# Patient Record
Sex: FEMALE | Race: BLACK OR AFRICAN AMERICAN | URBAN - METROPOLITAN AREA
[De-identification: names, ages, dates, MRNs, and addresses within clinical notes are randomized per-mention and may not be internally consistent; named-entity substitution may affect disease eponyms.]

---

## 2020-09-16 ENCOUNTER — IMPORTED ENCOUNTER (OUTPATIENT)
Dept: URBAN - METROPOLITAN AREA CLINIC 50 | Facility: CLINIC | Age: 60
End: 2020-09-16

## 2020-09-16 NOTE — PATIENT DISCUSSION
"""Type 1 diabetic eye exam with dilation. Mild diabetic retinopathy found. Bilateral macular edema is not present. Patient instructed to call office immediately if sudden changes in vision occur. Emphasized importance of good blood glucose control. Return in 1 year for dilated fundus exam. Summary of care provided to the physician managing the ongoing diabetes care. """

## 2020-10-15 ENCOUNTER — IMPORTED ENCOUNTER (OUTPATIENT)
Dept: URBAN - METROPOLITAN AREA CLINIC 50 | Facility: CLINIC | Age: 60
End: 2020-10-15

## 2020-10-20 ENCOUNTER — IMPORTED ENCOUNTER (OUTPATIENT)
Dept: URBAN - METROPOLITAN AREA CLINIC 50 | Facility: CLINIC | Age: 60
End: 2020-10-20

## 2021-04-17 ASSESSMENT — TONOMETRY
OS_IOP_MMHG: 18
OD_IOP_MMHG: 18
OD_IOP_MMHG: 18
OS_IOP_MMHG: 15
OD_IOP_MMHG: 15
OS_IOP_MMHG: 18

## 2021-04-17 ASSESSMENT — VISUAL ACUITY
OS_SC: 20/30-
OS_CC: J1
OD_SC: 20/20
OD_SC: 20/25
OS_SC: 20/25-
OD_CC: J1
OS_SC: 20/25
OD_SC: 20/25

## 2021-06-07 NOTE — PATIENT DISCUSSION
ASTIGMATISM OU- SHE IS A CANDIDATE FOR LASIK ALTHOUGH LASIK IS NOT RECOMMEND. PT DESIRES LESS DEPENDENCY ON CORRECTIVE LENSES. BASED ON HER AGE AND VISUAL GOALS, SHE MAY BENEFIT FROM RLE. AFTER DISCUSSION, PT DESIRES TO PROCEED WITH RLE WORK UP.

## 2021-06-18 NOTE — PATIENT DISCUSSION
HIGH HYPEROPIA WITH ASTIGMATISM OU, DISCUSSED REDUCING DEPENDENCE ON CONTACT LENSES WITH RLE. DISCUSSED RBA'S OF THE RLE PROCEDURE. PATIENT WOULD LIKE TO SEE AS GOOD AS HER VISION WITH HER CONTACT LENSES. PATIENT STATES SHE IS OKAY WITH READING GLASSES.

## 2021-06-18 NOTE — PATIENT DISCUSSION
***The patient is interested in refractive surgery. After discussing all options for becoming less dependent on glasses after surgery, the patient is considering distance vision with astigmatism correction if needed. The anticipated visual outcome is satisfactory distance. The patient is aware they will depend on glasses for all near and intermediate vision. ***

## 2021-06-18 NOTE — PATIENT DISCUSSION
GIVEN OPTION OF TORIC DISTANCE IOL VS EYHANCE TO ALLOW POSSIBLY SOME INTERMEDIATE VISION TO SEE HER DASHBOARD AND BOAT GAUGES. PATIENT UNDERSTANDS THAT WITH HER LEFT EYE BEING A LAZY EYE IF LIKELY WILL NEVER SEE AS GOOD AS THE RIGHT EYE EVEN AFTER SURGERY. PATIENT DENIES ANY DOUBLE VISION.

## 2021-06-18 NOTE — PATIENT DISCUSSION
DISCUSSED ANISOMETROPIA WILL PRESENT AFTER SURGERY IN THE OD. PATIENT OKAY TO HAVE SURGERY NEXT DAY ONCE CLEARED.

## 2021-06-18 NOTE — PATIENT DISCUSSION
"Surgery Drops: I have given the patient the option to chose whether they would like a prescribed regimen of drops or an all-in-one drop for use before and after RLE surgery. They have elected to use the all-in-one option of Pred-Gati-Brom (prednisolone acetate, gatifloxacin and bromfenac). Patient to administer ""as directed"". "

## 2021-06-29 NOTE — PATIENT DISCUSSION
COUNSELING: Occupational Therapy Daily Treatment    Visit Count: 8  Plan of Care:5/6/2019 Through: 7/29/2019  Insurance Information: Therapy Benefits     Payor: [employer]  Authorization Needed: After 8 weeks  (7/2/19). Fax to: [phone]. Put attention to reference # below.  Maximum Visit Limit Per Year: Medical necessity  CoPay: $0  Notes: Patient can be seen for PT and OT at same time  Call Ref #: [id number]  Lydia  Referred by: Urmila Jones MD; Next provider visit (if known/scheduled): 11/4/19  Medical Diagnosis (from order):  Left breast cancer  Treatment Diagnosis: Left UE symptoms with increased pain/symptoms, impaired strength, impaired range of motion, impaired muscle length/flexibility, impaired tissue mobility, impaired activity tolerance     Date of onset/injury: See below  Diagnosis Precautions: Thyroid cancer; Back surgery- now off of Gabepentin/pain medication; Lifting restriction of 10#   Chart reviewed at time of initial evaluation (relevant co-morbidities, allergies, tests and medications listed): Thyroid cancer; HTN, gout, anxiety, depression , back pain ; (reports she was numb entire longer legs for about 1.5 years). Scapular fracture; reports neck and shoulders were painful in the past (reported great amount of stress) .      History of Present Illness:   PER DR. 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  Patient underwent prior left breast and 9 biopsy in 2002 and again in 2006. Pathology revealed intraductal papilloma, ductal hyperplasia and atypical columnar hyperplasia. Patient noted swelling and erythema of left breast since July 2018.  Patient underwent bilateral diagnostic mammogram on October 23rd 2018. New abnormalities were identified in left breast.  Ultrasound and Breast MRI was subsequently performed.   Left  breast MRI reveals a conglomerate mass measuring 5.9 x 3.1 x 1.7 cm.   Pathology revealed invasive poorly differentiated ductal carcinoma of left breast and left axillary lymph node. Estrogen and progesterone receptors were negative. HER-2/sophia is negative.  Patient denies a family history of breast carcinoma.  Patient reports menarche at age 12.  She has not had a menstrual cycle since prior hysterectomy due to benign etiology.  She is here today after completing 7 cycles of neoadjuvant chemotherapy followed by left breast mastectomy April 19, 2019.  She did not complete her last cycle of chemotherapy due to significant cold morbidities.   Pathology revealed scattered foci of residual invasive ductal carcinoma measuring over a 2.5 cm span with extensive treatment effect.  Isolated tumor cells were identified in 3 out of 14 lymph nodes.  She was seen by radiation oncology earlier this morning.   Had Home Care RN ; demonstrating some gentle ROM exercises. Drains recently removed.       SUBJECTIVE   Reports she did not wear compressive halter and noted less tightness at elbow.  Tetagrip works well still. Tolerated radiation better.      Tingling reducing in  posterior arm.   Fatigue: 5-8/10    Current Pain (0-10 scale): 0/10 (was 2)  Functional Change: painful radiation position today.    OBJECTIVE   Lymphedema Circumference (cm): Upper Extremity  Date 5/9 5/9                        Landmarks   left right diff.                     15 cm prox 29.9  30.1                        10 cm prox  26.6  27.0                       5 cm prox 25.2  24.8                        elbow 23  23.0                        5 cm dist 23.3  23.0                        10 cm dist 20.6  21.4                        15 cm dist 17.1  17.4                        20 cm dist 15.7   15.7                       wrist 15.2  15.1                        Metacarpo  phalangeal  18.1  18.3                        Total 214.7  215.8                        %  loss/gain   1.1  cm                       Key: prox = proximal; dist = distal; different=difference         Range of Motion (degrees)    Left left L L   Date Initial 5-14 5/24 5/28  AA   Shoulder Flexion (170-180) 100 110  107 120   Shoulder Extension (50-60)         Shoulder Abduction (170-180) 60 100  112-S 130-S   Shoulder Adduction (50-75)         Shoulder Internal Rotation ()         Shoulder External Rotation (80-90) 70   72 80       Treatment   Therapeutic Exercise:     Side lying modified partial arc of motion with open book. with assist of therapist  x 5      Supine: AAROM: flexion/scaption x 7  Stick flexion/scaption x 5    s)     Supine pectoral stretch at 75 (was 65) with gentle elbow and wrist  With towel support : during manual work      Manual Therapy:   Lymphatic drainage of LUE  with left terminus and elbow clearing; re-routing across back from posterior upper arm. Increase time spent at upper arm and elbow.   Cording less visible.     MFR: cording at elbow to forearm with sustained holds x 5- tolerated well.     Therapeutic Activity    Reviewed rationale for not pushing into pain or with activity, returning to stick exercises.         Skilled input: verbal instruction/cues, tactile instruction/cues, posture correction    Home Program:    Home Program:  * above=instructed home program    Exercise: Date issued Date DC Comments   Distal ROM; gentle table slides  Gentle assisted flexion supine  Shoulder rolls 5/7        Stick flexion/scaption/butterfly  5/9        Wall slides                                 Writer verbally educated the patient and received verbal consent from the patient on hand placement, positioning of patient, and techniques to be performed today including clothing adjustments for techniques, hand placement and palpation for techniques as described above and how they are pertinent to the patient's plan of care.       Suggestions for next session as indicated: progress per plan  of care,   Review lymphatic massage for UE but follow radiation precautions,  back clearing ; Progress with gentle MFR as tolerated  Begin AROM as comfortable  MFR for cording.   ASSESSMENT   Continued improvement with  ROM with less reactivity with cording.  Tissue throughout LUE continues to feel firmer in texture but softens quicker with manual intervention.  Able to tolerate full extension with increasing abduction. Pt. Reports easier to get into radiation position. Good pain relief.  Should be able to decrease to 1-2x week in next 2 weeks.    Pain after treatment (patient reported, 0-10 scale): 0/10  Result of above outlined education: Verbalizes understanding, Demonstrates understanding and Needs reinforcement    THERAPY DAILY BILLING   Insurance: [employer] [facility] 2. N/A    Evaluation Procedures:  No evaluation codes were used on this date of service    Timed Procedures:  Manual Therapy, 30 minutes  Therapeutic Exercise, 15 minutes    Untimed Procedures:  No untimed codes were used on this date of service    Total Treatment Time: 45  minutes

## 2021-06-29 NOTE — PATIENT DISCUSSION
SAME DAY S/P RLE, OS - STABLE, DOING WELL. OK TO PROCEED WITH FELLOW EYE SURGERY. PT REPORTS THAT THEY ARE HAPPY WITH THE OUTCOME OF THE OPERATIVE EYE AND READY TO PURSUE SX IN THE FELLOW EYE.

## 2021-07-01 NOTE — PATIENT DISCUSSION
Continue: prednisol ace-gatiflox-bromfen (prednisol ace-gatiflox-bromfen): drops,suspension: 1-0.5-0.075% 1 drop three times a day as directed [facility] 06-

## 2021-08-30 ENCOUNTER — PREPPED CHART (OUTPATIENT)
Dept: URBAN - METROPOLITAN AREA CLINIC 48 | Facility: CLINIC | Age: 61
End: 2021-08-30

## 2021-08-30 ASSESSMENT — VISUAL ACUITY
OD_SC: 20/20
OS_SC: 20/25

## 2021-10-13 ENCOUNTER — ANNUAL COMPREHENSIVE EXAM (OUTPATIENT)
Dept: URBAN - METROPOLITAN AREA CLINIC 48 | Facility: CLINIC | Age: 61
End: 2021-10-13

## 2021-10-13 DIAGNOSIS — H18.20: ICD-10-CM

## 2021-10-13 DIAGNOSIS — E10.319: ICD-10-CM

## 2021-10-13 PROCEDURE — 92014 COMPRE OPH EXAM EST PT 1/>: CPT

## 2021-10-13 PROCEDURE — 92134 CPTRZ OPH DX IMG PST SGM RTA: CPT

## 2021-10-13 ASSESSMENT — VISUAL ACUITY
OU_SC: 20/20-1
OS_SC: J2
OD_SC: 20/20-1
OU_SC: J2
OD_SC: J2
OS_SC: 20/25-2

## 2021-10-13 ASSESSMENT — TONOMETRY
OD_IOP_MMHG: 17
OS_IOP_MMHG: 18

## 2021-10-13 NOTE — PATIENT DISCUSSION
Type 1 diabetic eye exam with dilation. Mild diabetic retinopathy found. Bilateral macular edema is not present. Patient instructed to call office immediately if sudden changes in vision occur. Emphasized importance of good blood glucose control. Return in 1 year for dilated fundus exam. Summary of care provided to the physician managing the ongoing diabetes care.

## 2022-10-13 ENCOUNTER — COMPREHENSIVE EXAM (OUTPATIENT)
Dept: URBAN - METROPOLITAN AREA CLINIC 48 | Facility: LOCATION | Age: 62
End: 2022-10-13

## 2022-10-13 DIAGNOSIS — H04.123: ICD-10-CM

## 2022-10-13 DIAGNOSIS — H18.20: ICD-10-CM

## 2022-10-13 DIAGNOSIS — H35.373: ICD-10-CM

## 2022-10-13 DIAGNOSIS — E10.3293: ICD-10-CM

## 2022-10-13 PROCEDURE — 92134 CPTRZ OPH DX IMG PST SGM RTA: CPT

## 2022-10-13 PROCEDURE — 92014 COMPRE OPH EXAM EST PT 1/>: CPT

## 2022-10-13 ASSESSMENT — TONOMETRY
OD_IOP_MMHG: 17
OS_IOP_MMHG: 17

## 2022-10-13 ASSESSMENT — VISUAL ACUITY
OD_SC: 20/20
OU_SC: J2
OS_SC: 20/25

## 2023-10-25 ENCOUNTER — COMPREHENSIVE EXAM (OUTPATIENT)
Dept: URBAN - METROPOLITAN AREA CLINIC 48 | Facility: LOCATION | Age: 63
End: 2023-10-25

## 2023-10-25 DIAGNOSIS — H35.373: ICD-10-CM

## 2023-10-25 DIAGNOSIS — E10.3293: ICD-10-CM

## 2023-10-25 DIAGNOSIS — H04.123: ICD-10-CM

## 2023-10-25 PROCEDURE — 92134 CPTRZ OPH DX IMG PST SGM RTA: CPT

## 2023-10-25 PROCEDURE — 92014 COMPRE OPH EXAM EST PT 1/>: CPT

## 2023-10-25 ASSESSMENT — VISUAL ACUITY
OD_SC: 20/25
OS_SC: 20/25-1
OD_GLARE: 20/20
OS_GLARE: 20/25
OU_CC: J1 @16IN

## 2023-10-25 ASSESSMENT — KERATOMETRY
OD_K1POWER_DIOPTERS: 46.25
OS_AXISANGLE2_DEGREES: 95
OD_AXISANGLE_DEGREES: 170
OS_AXISANGLE_DEGREES: 5
OD_AXISANGLE2_DEGREES: 80
OS_K1POWER_DIOPTERS: 46.75
OS_K2POWER_DIOPTERS: 45.75
OD_K2POWER_DIOPTERS: 45.50

## 2023-10-25 ASSESSMENT — TONOMETRY
OS_IOP_MMHG: 16
OD_IOP_MMHG: 16

## 2025-06-16 ENCOUNTER — APPOINTMENT (OUTPATIENT)
Dept: URBAN - METROPOLITAN AREA CLINIC 76 | Facility: CLINIC | Age: 65
Setting detail: DERMATOLOGY
End: 2025-06-16

## 2025-06-16 DIAGNOSIS — L85.3 XEROSIS CUTIS: ICD-10-CM

## 2025-06-16 DIAGNOSIS — R21 RASH AND OTHER NONSPECIFIC SKIN ERUPTION: ICD-10-CM

## 2025-06-16 DIAGNOSIS — D18.0 HEMANGIOMA: ICD-10-CM

## 2025-06-16 PROBLEM — D18.01 HEMANGIOMA OF SKIN AND SUBCUTANEOUS TISSUE: Status: ACTIVE | Noted: 2025-06-16

## 2025-06-16 PROCEDURE — ? PRESCRIPTION

## 2025-06-16 PROCEDURE — ?

## 2025-06-16 PROCEDURE — ? COUNSELING: TOPICAL STEROIDS

## 2025-06-16 PROCEDURE — ? PRESCRIPTION MEDICATION MANAGEMENT

## 2025-06-16 PROCEDURE — ? COUNSELING

## 2025-06-16 RX ORDER — TRIAMCINOLONE ACETONIDE 1 MG/G
CREAM TOPICAL BID
Qty: 80 | Refills: 3 | Status: ERX | COMMUNITY
Start: 2025-06-16

## 2025-06-16 RX ADMIN — TRIAMCINOLONE ACETONIDE: 1 CREAM TOPICAL at 00:00

## 2025-06-16 ASSESSMENT — LOCATION ZONE DERM
LOCATION ZONE: LEG
LOCATION ZONE: ARM

## 2025-06-16 ASSESSMENT — LOCATION SIMPLE DESCRIPTION DERM
LOCATION SIMPLE: LEFT PRETIBIAL REGION
LOCATION SIMPLE: RIGHT PRETIBIAL REGION
LOCATION SIMPLE: RIGHT THIGH
LOCATION SIMPLE: RIGHT POSTERIOR THIGH
LOCATION SIMPLE: LEFT THIGH
LOCATION SIMPLE: LEFT FOREARM

## 2025-06-16 ASSESSMENT — LOCATION DETAILED DESCRIPTION DERM
LOCATION DETAILED: RIGHT PROXIMAL PRETIBIAL REGION
LOCATION DETAILED: RIGHT ANTERIOR DISTAL THIGH
LOCATION DETAILED: RIGHT PROXIMAL LATERAL POSTERIOR THIGH
LOCATION DETAILED: LEFT PROXIMAL DORSAL FOREARM
LOCATION DETAILED: LEFT DISTAL PRETIBIAL REGION
LOCATION DETAILED: RIGHT ANTERIOR PROXIMAL THIGH
LOCATION DETAILED: LEFT ANTERIOR DISTAL THIGH
LOCATION DETAILED: LEFT DISTAL DORSAL FOREARM

## 2025-06-16 ASSESSMENT — BSA RASH: BSA RASH: 3

## 2025-06-16 ASSESSMENT — SEVERITY ASSESSMENT: SEVERITY: ALMOST CLEAR

## 2025-06-16 NOTE — HPI: RASH
What Type Of Note Output Would You Prefer (Optional)?: Standard Output
Is The Patient Presenting As Previously Scheduled?: No, they are a work-in
How Severe Is Your Rash?: moderate
Is This A New Presentation, Or A Follow-Up?: Follow Up Rash
Additional History: Pt went to urgent care provider, where she was prescribed TAC which seemed to help. Rash has significantly calmed down since she saw UC.

## 2025-06-16 NOTE — PROCEDURE: PRESCRIPTION MEDICATION MANAGEMENT
Render In Strict Bullet Format?: No
Continue Regimen: Restart Triamcinolone twice a day for 2-3 weeks
Detail Level: Zone

## 2025-07-14 ENCOUNTER — COMPREHENSIVE EXAM (OUTPATIENT)
Age: 65
End: 2025-07-14

## 2025-07-14 DIAGNOSIS — H18.20: ICD-10-CM

## 2025-07-14 DIAGNOSIS — H35.373: ICD-10-CM

## 2025-07-14 DIAGNOSIS — H04.123: ICD-10-CM

## 2025-07-14 DIAGNOSIS — E10.3293: ICD-10-CM

## 2025-07-14 PROCEDURE — 99214 OFFICE O/P EST MOD 30 MIN: CPT

## 2025-07-14 PROCEDURE — 92134 CPTRZ OPH DX IMG PST SGM RTA: CPT
